# Patient Record
Sex: MALE | Race: WHITE | NOT HISPANIC OR LATINO | URBAN - METROPOLITAN AREA
[De-identification: names, ages, dates, MRNs, and addresses within clinical notes are randomized per-mention and may not be internally consistent; named-entity substitution may affect disease eponyms.]

---

## 2020-01-01 ENCOUNTER — WEB ENCOUNTER (OUTPATIENT)
Dept: URBAN - METROPOLITAN AREA CLINIC 113 | Facility: CLINIC | Age: 70
End: 2020-01-01

## 2020-01-01 ENCOUNTER — TELEPHONE ENCOUNTER (OUTPATIENT)
Dept: URBAN - METROPOLITAN AREA CLINIC 113 | Facility: CLINIC | Age: 70
End: 2020-01-01

## 2020-01-01 ENCOUNTER — TELEPHONE ENCOUNTER (OUTPATIENT)
Dept: URBAN - METROPOLITAN AREA CLINIC 13 | Facility: CLINIC | Age: 70
End: 2020-01-01

## 2020-01-01 ENCOUNTER — OFFICE VISIT (OUTPATIENT)
Dept: URBAN - METROPOLITAN AREA MEDICAL CENTER 19 | Facility: MEDICAL CENTER | Age: 70
End: 2020-01-01
Payer: MEDICARE

## 2020-01-01 ENCOUNTER — OFFICE VISIT (OUTPATIENT)
Dept: URBAN - METROPOLITAN AREA CLINIC 113 | Facility: CLINIC | Age: 70
End: 2020-01-01
Payer: MEDICARE

## 2020-01-01 VITALS
TEMPERATURE: 98.4 F | WEIGHT: 203 LBS | DIASTOLIC BLOOD PRESSURE: 68 MMHG | BODY MASS INDEX: 24.72 KG/M2 | SYSTOLIC BLOOD PRESSURE: 104 MMHG | HEIGHT: 76 IN | HEART RATE: 72 BPM

## 2020-01-01 VITALS
RESPIRATION RATE: 20 BRPM | WEIGHT: 191 LBS | HEIGHT: 76 IN | DIASTOLIC BLOOD PRESSURE: 68 MMHG | HEART RATE: 84 BPM | SYSTOLIC BLOOD PRESSURE: 112 MMHG | TEMPERATURE: 98.2 F | BODY MASS INDEX: 23.26 KG/M2

## 2020-01-01 DIAGNOSIS — K59.09 OTHER CONSTIPATION: ICD-10-CM

## 2020-01-01 DIAGNOSIS — K31.89 OTHER DISEASES OF STOMACH AND DUODENUM: ICD-10-CM

## 2020-01-01 DIAGNOSIS — I85.00 ESOPHAGEAL VARICES WITHOUT BLEEDING, UNSPECIFIED ESOPHAGEAL VARICES TYPE: ICD-10-CM

## 2020-01-01 DIAGNOSIS — K76.6 PORTAL HYPERTENSION: ICD-10-CM

## 2020-01-01 DIAGNOSIS — K74.60 ADVANCED CIRRHOSIS OF LIVER: ICD-10-CM

## 2020-01-01 DIAGNOSIS — K76.6 PORTAL HYPERTENSION DUE TO OBSTRUCTION OF EXTRAHEPATIC PORTAL VEIN: ICD-10-CM

## 2020-01-01 DIAGNOSIS — K31.89 ACQUIRED DEFORMITY OF PYLORUS: ICD-10-CM

## 2020-01-01 DIAGNOSIS — R18.8 OTHER ASCITES: ICD-10-CM

## 2020-01-01 DIAGNOSIS — K74.69 OTHER CIRRHOSIS OF LIVER: ICD-10-CM

## 2020-01-01 DIAGNOSIS — Z86.010 HISTORY OF ADENOMATOUS POLYP OF COLON: ICD-10-CM

## 2020-01-01 DIAGNOSIS — I85.10 SECONDARY ESOPHAGEAL VARICES WITHOUT BLEEDING: ICD-10-CM

## 2020-01-01 PROCEDURE — 99214 OFFICE O/P EST MOD 30 MIN: CPT | Performed by: NURSE PRACTITIONER

## 2020-01-01 PROCEDURE — 43235 EGD DIAGNOSTIC BRUSH WASH: CPT | Performed by: INTERNAL MEDICINE

## 2020-01-01 PROCEDURE — G8427 DOCREV CUR MEDS BY ELIG CLIN: HCPCS | Performed by: NURSE PRACTITIONER

## 2020-01-01 PROCEDURE — 99214 OFFICE O/P EST MOD 30 MIN: CPT | Performed by: INTERNAL MEDICINE

## 2020-01-01 RX ORDER — OXYCODONE HYDROCHLORIDE 5 MG/1
TABLET ORAL
Qty: 10 | Refills: 0 | Status: ACTIVE | COMMUNITY
Start: 2019-04-02

## 2020-01-01 RX ORDER — ONDANSETRON 4 MG/1
TABLET, ORALLY DISINTEGRATING ORAL
Qty: 20 | Refills: 0 | Status: ACTIVE | COMMUNITY
Start: 2019-04-02

## 2020-01-01 RX ORDER — CEPHALEXIN 500 MG/1
CAPSULE ORAL
Qty: 28 | Refills: 0 | Status: ACTIVE | COMMUNITY
Start: 2019-04-18

## 2020-01-01 RX ORDER — SPIRONOLACTONE 100 MG/1
TAKE 1 TABLET DAILY TABLET, FILM COATED ORAL
Qty: 30 | Refills: 3 | Status: ACTIVE | COMMUNITY
Start: 2020-01-01

## 2020-01-01 RX ORDER — LEVOFLOXACIN 250 MG/1
TAKE 1 TABLET DAILY AS DIRECTED TABLET, FILM COATED ORAL
Qty: 7 | Refills: 0 | Status: ACTIVE | COMMUNITY
Start: 2020-01-01

## 2020-01-01 RX ORDER — PLECANATIDE 3 MG/1
1 TABLET TABLET ORAL ONCE A DAY
Qty: 30 TABLET | Refills: 3 | OUTPATIENT
Start: 2020-01-01 | End: 2020-01-01

## 2020-01-01 RX ORDER — OXYCODONE HYDROCHLORIDE 5 MG/1
TABLET ORAL
Qty: 20 | Refills: 0 | Status: ACTIVE | COMMUNITY
Start: 2019-04-19

## 2020-01-01 RX ORDER — FUROSEMIDE 40 MG/1
TAKE 1 TABLET BY MOUTH DAILY TABLET ORAL
Qty: 30 | Refills: 3 | Status: ACTIVE | COMMUNITY
Start: 2020-01-01

## 2020-01-01 RX ORDER — SPIRONOLACTONE 100 MG/1
TAKE 1 TABLET DAILY TABLET, FILM COATED ORAL ONCE A DAY
Qty: 30 | Refills: 3
Start: 2020-01-01 | End: 2020-01-01

## 2020-01-01 RX ORDER — SPIRONOLACTONE 100 MG/1
TAKE 1 TABLET DAILY TABLET, FILM COATED ORAL ONCE A DAY
Qty: 30 | Refills: 3
Start: 2020-01-01

## 2020-01-01 RX ORDER — LINACLOTIDE 72 UG/1
1 CAPSULE AT LEAST 30 MINUTES BEFORE THE FIRST MEAL OF THE DAY ON AN EMPTY STOMACH CAPSULE, GELATIN COATED ORAL ONCE A DAY
Qty: 30 | Refills: 2 | OUTPATIENT
Start: 2020-01-01 | End: 2020-01-01

## 2020-01-01 RX ORDER — SPIRONOLACTONE 100 MG/1
TAKE 1 TABLET DAILY TABLET, FILM COATED ORAL ONCE A DAY
Qty: 90 | Refills: 3 | Status: ACTIVE | COMMUNITY
Start: 2020-01-01 | End: 2021-06-21

## 2020-01-01 RX ORDER — FUROSEMIDE 20 MG/1
TABLET ORAL
Qty: 60 | Refills: 0 | Status: ACTIVE | COMMUNITY
Start: 2019-04-19

## 2020-01-01 RX ORDER — SPIRONOLACTONE 25 MG/1
TABLET ORAL
Qty: 30 | Refills: 0 | Status: ACTIVE | COMMUNITY
Start: 2019-04-19

## 2020-01-01 RX ORDER — POLYETHYLENE GLYCOL-3350 AND ELECTROLYTES 236; 6.74; 5.86; 2.97; 22.74 G/274.31G; G/274.31G; G/274.31G; G/274.31G; G/274.31G
POWDER, FOR SOLUTION ORAL
Qty: 4000 | Refills: 0 | Status: ACTIVE | COMMUNITY
Start: 2019-04-05

## 2020-01-01 RX ORDER — SPIRONOLACTONE 100 MG/1
TAKE 1 TABLET DAILY TABLET, FILM COATED ORAL ONCE A DAY
Qty: 90 | Refills: 3
Start: 2020-01-01 | End: 2021-06-21

## 2020-01-01 RX ORDER — LINACLOTIDE 72 UG/1
1 CAPSULE AT LEAST 30 MINUTES BEFORE THE FIRST MEAL OF THE DAY ON AN EMPTY STOMACH CAPSULE, GELATIN COATED ORAL ONCE A DAY
Qty: 30 | Refills: 2 | Status: ACTIVE | COMMUNITY
Start: 2020-01-01 | End: 2020-01-01

## 2020-01-01 RX ORDER — SULFAMETHOXAZOLE AND TRIMETHOPRIM 800; 160 MG/1; MG/1
TAKE 1 TABLET BY MOUTH TWICE DAILY UNTIL ALL TAKEN TABLET ORAL
Qty: 20 | Refills: 0 | OUTPATIENT
Start: 2020-01-24 | End: 2020-03-10

## 2020-01-01 RX ORDER — FUROSEMIDE 40 MG/1
TAKE 1 TABLET BY MOUTH DAILY TABLET ORAL
Qty: 30 | Refills: 3
Start: 2020-01-01

## 2020-09-17 NOTE — PHYSICAL EXAM GASTROINTESTINAL
Abdomen , soft, nontender, nondistended , no guarding or rigidity , no masses palpable , normal bowel sounds , no clear evidence of ascites, Liver and Spleen , no hepatosplenomegaly

## 2020-09-17 NOTE — HPI-TODAY'S VISIT:
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    He was last seen 3/10/2020.  He had been referred to [facility] for liver transplant evaluation.  Labs were stable and HCC surveillance was up-to-date.  Recent MRI was negative for recurrence.  Because of a symptomatic right inguinal hernia, he was scheduled for consultation with surgery at [facility].  Screening for esophageal varices was up-to-date.  He was instructed to continue follow-up with [facility] as scheduled.  Labs and an MRI were to be performed in August and he was to be scheduled for an EGD for variceal screening in August.    EGD 8/31/2020: Grade 1 varices in the lower third of the esophagus, moderate portal hypertensive gastropathy in the entire stomach, normal examined duodenum.    MRI of the abdomen with and without contrast 8/6/2020: Cirrhotic hepatic morphology with a similar-appearing ablation defect within the right hepatic lobe.  No suspicious enhancement or nodularity to suggest residual/recurrent disease at the ablation site.  No new or additional concerning hepatic mass.  Sequela of portal hypertension with splenomegaly, multiple venous collaterals, and small volume ascites.   Labs 7/20/2020 CBC: WBC 4.1, hemoglobin 11.6, , platelets 69.  BMP normal with exception of calcium 8.4, albumin 3.0 and notable for creatinine 0.81, sodium 136.  LFTs: TB 4.8, , ALT 52, .  Urinalysis notable for cloudy appearance, trace WBC esterase, positive nitrites.  Hemoglobin A1c less than 4.2.  TSH 1.930.  PSA 0.2.  He had a right inguinal hernia repair 5/1/2020 at [facility].  Afterward, he began to experience constipation.  He tried Trulance for 7 days and it was ineffective.  He was prescribed Linzess 72 mcg daily.  With the exception of 2 days, since 9/3/2020, he has had a hard, dry bowel movement every day with a sensation of incomplete evacuation.  He denies red blood per rectum or melena.  Prior to a hernia repair, he was taking MiraLAX every Sunday and had regular bowel movements.  He reports right-sided abdominal pain (right mid abdomen to RLQ) associated with constipation exacerbated by eating and relieved with a bowel movement.  He also reports bloating associated with pain.  He denies any other abdominal symptoms.  His weight is stable.  He had recent labs with his oncologist, Dr. Davidson Wellington in [place].  He reports a slowly improving platelet count.  He has not followed up with the liver transplant team at [facility] since his initial evaluation.  He denies alcohol use.

## 2020-12-28 NOTE — HPI-TODAY'S VISIT:
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    He was last seen 9/17/2020.  He had been referred to [facility] for liver transplant evaluation.  Labs were stable and HCC surveillance was up-to-date.  Recent MRI was negative for recurrence.  Because of a symptomatic right inguinal hernia, he was scheduled for consultation with surgery at [facility].  Screening for esophageal varices was up-to-date.  He was instructed to continue follow-up with [facility] as scheduled.  Labs and an MRI were to be performed in August and he was to be scheduled for an EGD for variceal screening in August.    EGD 8/31/2020: Grade 1 varices in the lower third of the esophagus, moderate portal hypertensive gastropathy in the entire stomach, normal examined duodenum.    MRI of the abdomen with and without contrast 8/6/2020: Cirrhotic hepatic morphology with a similar-appearing ablation defect within the right hepatic lobe.  No suspicious enhancement or nodularity to suggest residual/recurrent disease at the ablation site.  No new or additional concerning hepatic mass.  Sequela of portal hypertension with splenomegaly, multiple venous collaterals, and small volume ascites.   Labs 7/20/2020 CBC: WBC 4.1, hemoglobin 11.6, , platelets 69.  BMP normal with exception of calcium 8.4, albumin 3.0 and notable for creatinine 0.81, sodium 136.  LFTs: TB 4.8, , ALT 52, .  Urinalysis notable for cloudy appearance, trace WBC esterase, positive nitrites.  Hemoglobin A1c less than 4.2.  TSH 1.930.  PSA 0.2.  He had a right inguinal hernia repair 5/1/2020 at [facility].  Afterward, he began to experience constipation.  He tried Trulance for 7 days and it was ineffective.  He was prescribed Linzess 72 mcg daily.  When he was last seen here, he was having great difficulty with constipation despite the use of Linzess and MiraLAX.  He also had right lower quadrant abdominal pain associated with constipation, exacerbated by food ingestion and improved with defecation.  This was his major GI complaint.  The patient underwent upper GI endoscopy on August 31, 2020 showing grade 1 esophageal varices and portal hypertensive gastropathy.  He had an MRI of the liver on August 6, 2020 showing no evidence of recurrent malignancy.  After his last visit here in September, he was scheduled for a follow-up visit with [facility].  He had a large-volume paracentesis of 8 L approximately a week and a half ago.  He had a visit with Dr. Tsang at [facility] on December 11, 2020.  The patient was still being considered for possible liver transplantation.  He complains of extreme fatigue, lethargy, loss of appetite, and overall malaise.He has lost 9 pounds since his last visit here, and approximately 30 pounds since September 2018.  He denies any GI bleeding, fever, chills, sweats, hepatic encephalopathy, etc.  He recently had his diuretics adjusted, and has labs pending with regard to that by his primary care physician in the next week.   He has a history of colon polyps. Last colonoscopy was in 2018.  He is due for follow-up colonoscopy in 2021.

## 2020-12-30 PROBLEM — 303082009: Status: ACTIVE | Noted: 2020-01-01

## 2020-12-30 PROBLEM — 429047008: Status: ACTIVE | Noted: 2020-01-01

## 2020-12-30 PROBLEM — 14760008: Status: ACTIVE | Noted: 2020-01-01

## 2020-12-30 PROBLEM — 14223005: Status: ACTIVE | Noted: 2020-01-01

## 2020-12-30 PROBLEM — 389026000: Status: ACTIVE | Noted: 2020-01-01

## 2020-12-30 PROBLEM — 19943007: Status: ACTIVE | Noted: 2020-01-01

## 2021-01-01 ENCOUNTER — CLAIMS CREATED FROM THE CLAIM WINDOW (OUTPATIENT)
Dept: URBAN - METROPOLITAN AREA MEDICAL CENTER 40 | Facility: MEDICAL CENTER | Age: 71
End: 2021-01-01
Payer: MEDICARE

## 2021-01-01 ENCOUNTER — DASHBOARD ENCOUNTERS (OUTPATIENT)
Age: 71
End: 2021-01-01

## 2021-01-01 ENCOUNTER — OFFICE VISIT (OUTPATIENT)
Dept: URBAN - METROPOLITAN AREA CLINIC 113 | Facility: CLINIC | Age: 71
End: 2021-01-01

## 2021-01-01 DIAGNOSIS — K74.69 OTHER CIRRHOSIS OF LIVER: ICD-10-CM

## 2021-01-01 DIAGNOSIS — R18.8 OTHER ASCITES: ICD-10-CM

## 2021-01-01 DIAGNOSIS — E87.1 HYPO-OSMOLALITY AND HYPONATREMIA: ICD-10-CM

## 2021-01-01 DIAGNOSIS — I85.00 ESOPHAGEAL VARICES WITHOUT BLEEDING: ICD-10-CM

## 2021-01-01 PROCEDURE — 99214 OFFICE O/P EST MOD 30 MIN: CPT | Performed by: INTERNAL MEDICINE

## 2021-01-01 PROCEDURE — 99213 OFFICE O/P EST LOW 20 MIN: CPT | Performed by: INTERNAL MEDICINE

## 2021-01-01 RX ORDER — SPIRONOLACTONE 100 MG/1
TAKE 1 TABLET DAILY TABLET, FILM COATED ORAL ONCE A DAY
Qty: 90 | Refills: 3 | Status: ACTIVE | COMMUNITY
Start: 2020-01-01 | End: 2021-06-21

## 2021-01-01 RX ORDER — FUROSEMIDE 40 MG/1
TAKE 1 TABLET BY MOUTH DAILY TABLET ORAL
Qty: 30 | Refills: 3 | Status: ACTIVE | COMMUNITY
Start: 2020-01-01

## 2021-05-03 NOTE — HPI-TODAY'S VISIT:
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    He was last seen 9/17/2020.  He had been referred to [facility] for liver transplant evaluation.  Labs were stable and HCC surveillance was up-to-date.  Recent MRI was negative for recurrence.  Because of a symptomatic right inguinal hernia, he was scheduled for consultation with surgery at [facility].  Screening for esophageal varices was up-to-date.  He was instructed to continue follow-up with [facility] as scheduled.  Labs and an MRI were to be performed in August and he was to be scheduled for an EGD for variceal screening in August.    EGD 8/31/2020: Grade 1 varices in the lower third of the esophagus, moderate portal hypertensive gastropathy in the entire stomach, normal examined duodenum.    MRI of the abdomen with and without contrast 8/6/2020: Cirrhotic hepatic morphology with a similar-appearing ablation defect within the right hepatic lobe.  No suspicious enhancement or nodularity to suggest residual/recurrent disease at the ablation site.  No new or additional concerning hepatic mass.  Sequela of portal hypertension with splenomegaly, multiple venous collaterals, and small volume ascites.   Labs 7/20/2020 CBC: WBC 4.1, hemoglobin 11.6, , platelets 69.  BMP normal with exception of calcium 8.4, albumin 3.0 and notable for creatinine 0.81, sodium 136.  LFTs: TB 4.8, , ALT 52, .  Urinalysis notable for cloudy appearance, trace WBC esterase, positive nitrites.  Hemoglobin A1c less than 4.2.  TSH 1.930.  PSA 0.2.  He had a right inguinal hernia repair 5/1/2020 at [facility].  Afterward, he began to experience constipation.  He tried Trulance for 7 days and it was ineffective.  He was prescribed Linzess 72 mcg daily.  When he was last seen here, he was having great difficulty with constipation despite the use of Linzess and MiraLAX.  He also had right lower quadrant abdominal pain associated with constipation, exacerbated by food ingestion and improved with defecation.  This was his major GI complaint.  The patient underwent upper GI endoscopy on August 31, 2020 showing grade 1 esophageal varices and portal hypertensive gastropathy.  He had an MRI of the liver on August 6, 2020 showing no evidence of recurrent malignancy.  After his last visit here in September, he was scheduled for a follow-up visit with [facility].  He had a large-volume paracentesis of 8 L approximately a week and a half ago.  He had a visit with Dr. Tsang at [facility] on December 11, 2020.  The patient was still being considered for possible liver transplantation.  He complains of extreme fatigue, lethargy, loss of appetite, and overall malaise.He has lost 9 pounds since his last visit here, and approximately 30 pounds since September 2018.  He denies any GI bleeding, fever, chills, sweats, hepatic encephalopathy, etc.  He recently had his diuretics adjusted, and has labs pending with regard to that by his primary care physician in the next week.   He has a history of colon polyps. Last colonoscopy was in 2018.  He is due for follow-up colonoscopy in 2021.

## 2021-06-01 ENCOUNTER — OFFICE VISIT (OUTPATIENT)
Dept: URBAN - METROPOLITAN AREA CLINIC 72 | Facility: CLINIC | Age: 71
End: 2021-06-01